# Patient Record
Sex: FEMALE | Race: WHITE | NOT HISPANIC OR LATINO | URBAN - METROPOLITAN AREA
[De-identification: names, ages, dates, MRNs, and addresses within clinical notes are randomized per-mention and may not be internally consistent; named-entity substitution may affect disease eponyms.]

---

## 2017-10-21 ENCOUNTER — OUTPATIENT (OUTPATIENT)
Dept: OUTPATIENT SERVICES | Facility: HOSPITAL | Age: 57
LOS: 1 days | Discharge: HOME | End: 2017-10-21

## 2017-10-21 DIAGNOSIS — Z12.31 ENCOUNTER FOR SCREENING MAMMOGRAM FOR MALIGNANT NEOPLASM OF BREAST: ICD-10-CM

## 2018-03-01 PROBLEM — Z00.00 ENCOUNTER FOR PREVENTIVE HEALTH EXAMINATION: Status: ACTIVE | Noted: 2018-03-01

## 2018-03-05 ENCOUNTER — APPOINTMENT (OUTPATIENT)
Dept: OBGYN | Facility: CLINIC | Age: 58
End: 2018-03-05
Payer: COMMERCIAL

## 2018-03-05 PROCEDURE — 99396 PREV VISIT EST AGE 40-64: CPT

## 2018-05-17 ENCOUNTER — APPOINTMENT (OUTPATIENT)
Dept: OBGYN | Facility: CLINIC | Age: 58
End: 2018-05-17
Payer: COMMERCIAL

## 2018-05-17 PROCEDURE — 99214 OFFICE O/P EST MOD 30 MIN: CPT

## 2018-05-29 ENCOUNTER — OUTPATIENT (OUTPATIENT)
Dept: OUTPATIENT SERVICES | Facility: HOSPITAL | Age: 58
LOS: 1 days | Discharge: HOME | End: 2018-05-29

## 2018-05-29 DIAGNOSIS — R92.8 OTHER ABNORMAL AND INCONCLUSIVE FINDINGS ON DIAGNOSTIC IMAGING OF BREAST: ICD-10-CM

## 2018-10-30 ENCOUNTER — OUTPATIENT (OUTPATIENT)
Dept: OUTPATIENT SERVICES | Facility: HOSPITAL | Age: 58
LOS: 1 days | Discharge: HOME | End: 2018-10-30

## 2018-10-30 DIAGNOSIS — Z12.31 ENCOUNTER FOR SCREENING MAMMOGRAM FOR MALIGNANT NEOPLASM OF BREAST: ICD-10-CM

## 2018-11-06 ENCOUNTER — OUTPATIENT (OUTPATIENT)
Dept: OUTPATIENT SERVICES | Facility: HOSPITAL | Age: 58
LOS: 1 days | Discharge: HOME | End: 2018-11-06

## 2018-11-06 DIAGNOSIS — R92.8 OTHER ABNORMAL AND INCONCLUSIVE FINDINGS ON DIAGNOSTIC IMAGING OF BREAST: ICD-10-CM

## 2018-11-14 ENCOUNTER — OUTPATIENT (OUTPATIENT)
Dept: OUTPATIENT SERVICES | Facility: HOSPITAL | Age: 58
LOS: 1 days | Discharge: HOME | End: 2018-11-14

## 2018-11-14 ENCOUNTER — RESULT REVIEW (OUTPATIENT)
Age: 58
End: 2018-11-14

## 2018-11-14 VITALS — HEART RATE: 72 BPM | RESPIRATION RATE: 15 BRPM | DIASTOLIC BLOOD PRESSURE: 53 MMHG | SYSTOLIC BLOOD PRESSURE: 105 MMHG

## 2018-11-14 VITALS
DIASTOLIC BLOOD PRESSURE: 70 MMHG | OXYGEN SATURATION: 100 % | HEIGHT: 64 IN | TEMPERATURE: 98 F | SYSTOLIC BLOOD PRESSURE: 119 MMHG | RESPIRATION RATE: 18 BRPM | WEIGHT: 154.98 LBS | HEART RATE: 74 BPM

## 2018-11-14 DIAGNOSIS — Z98.890 OTHER SPECIFIED POSTPROCEDURAL STATES: Chronic | ICD-10-CM

## 2018-11-15 LAB
SURGICAL PATHOLOGY STUDY: SIGNIFICANT CHANGE UP
SURGICAL PATHOLOGY STUDY: SIGNIFICANT CHANGE UP

## 2018-11-16 DIAGNOSIS — D12.5 BENIGN NEOPLASM OF SIGMOID COLON: ICD-10-CM

## 2018-11-16 DIAGNOSIS — Z12.11 ENCOUNTER FOR SCREENING FOR MALIGNANT NEOPLASM OF COLON: ICD-10-CM

## 2018-11-16 DIAGNOSIS — K64.4 RESIDUAL HEMORRHOIDAL SKIN TAGS: ICD-10-CM

## 2018-11-16 DIAGNOSIS — D12.3 BENIGN NEOPLASM OF TRANSVERSE COLON: ICD-10-CM

## 2018-11-16 DIAGNOSIS — D12.8 BENIGN NEOPLASM OF RECTUM: ICD-10-CM

## 2018-11-16 DIAGNOSIS — K29.50 UNSPECIFIED CHRONIC GASTRITIS WITHOUT BLEEDING: ICD-10-CM

## 2018-11-16 DIAGNOSIS — K63.89 OTHER SPECIFIED DISEASES OF INTESTINE: ICD-10-CM

## 2018-11-16 DIAGNOSIS — K57.30 DIVERTICULOSIS OF LARGE INTESTINE WITHOUT PERFORATION OR ABSCESS WITHOUT BLEEDING: ICD-10-CM

## 2019-01-24 NOTE — ASU PATIENT PROFILE, ADULT - PATIENT'S HEIGHT AND WEIGHT RECORDED IN THE VITAL SIGNS FLOWSHEET
The types of intraocular lenses were reviewed with the patient along with a discussion of their various strengths and weaknesses. yes

## 2019-02-15 PROBLEM — Z87.39 PERSONAL HISTORY OF OTHER DISEASES OF THE MUSCULOSKELETAL SYSTEM AND CONNECTIVE TISSUE: Chronic | Status: ACTIVE | Noted: 2018-11-14

## 2019-03-07 ENCOUNTER — APPOINTMENT (OUTPATIENT)
Dept: OBGYN | Facility: CLINIC | Age: 59
End: 2019-03-07
Payer: COMMERCIAL

## 2019-03-07 PROCEDURE — 99396 PREV VISIT EST AGE 40-64: CPT

## 2019-11-02 ENCOUNTER — OUTPATIENT (OUTPATIENT)
Dept: OUTPATIENT SERVICES | Facility: HOSPITAL | Age: 59
LOS: 1 days | Discharge: HOME | End: 2019-11-02
Payer: COMMERCIAL

## 2019-11-02 DIAGNOSIS — Z12.31 ENCOUNTER FOR SCREENING MAMMOGRAM FOR MALIGNANT NEOPLASM OF BREAST: ICD-10-CM

## 2019-11-02 DIAGNOSIS — Z98.890 OTHER SPECIFIED POSTPROCEDURAL STATES: Chronic | ICD-10-CM

## 2019-11-02 PROCEDURE — 77067 SCR MAMMO BI INCL CAD: CPT | Mod: 26

## 2019-11-02 PROCEDURE — 77063 BREAST TOMOSYNTHESIS BI: CPT | Mod: 26

## 2019-11-05 DIAGNOSIS — Z78.0 ASYMPTOMATIC MENOPAUSAL STATE: ICD-10-CM

## 2019-11-05 DIAGNOSIS — M81.0 AGE-RELATED OSTEOPOROSIS WITHOUT CURRENT PATHOLOGICAL FRACTURE: ICD-10-CM

## 2019-11-05 DIAGNOSIS — Z09 ENCOUNTER FOR FOLLOW-UP EXAMINATION AFTER COMPLETED TREATMENT FOR CONDITIONS OTHER THAN MALIGNANT NEOPLASM: ICD-10-CM

## 2020-03-12 ENCOUNTER — APPOINTMENT (OUTPATIENT)
Dept: OBGYN | Facility: CLINIC | Age: 60
End: 2020-03-12
Payer: COMMERCIAL

## 2020-03-12 PROCEDURE — 99396 PREV VISIT EST AGE 40-64: CPT

## 2020-11-07 ENCOUNTER — RESULT REVIEW (OUTPATIENT)
Age: 60
End: 2020-11-07

## 2020-11-07 ENCOUNTER — OUTPATIENT (OUTPATIENT)
Dept: OUTPATIENT SERVICES | Facility: HOSPITAL | Age: 60
LOS: 1 days | Discharge: HOME | End: 2020-11-07
Payer: COMMERCIAL

## 2020-11-07 DIAGNOSIS — Z98.890 OTHER SPECIFIED POSTPROCEDURAL STATES: Chronic | ICD-10-CM

## 2020-11-07 DIAGNOSIS — R92.8 OTHER ABNORMAL AND INCONCLUSIVE FINDINGS ON DIAGNOSTIC IMAGING OF BREAST: ICD-10-CM

## 2020-11-07 PROCEDURE — G0279: CPT | Mod: 26

## 2020-11-07 PROCEDURE — 76642 ULTRASOUND BREAST LIMITED: CPT | Mod: 26,LT

## 2020-11-07 PROCEDURE — 77066 DX MAMMO INCL CAD BI: CPT | Mod: 26

## 2021-03-25 ENCOUNTER — FORM ENCOUNTER (OUTPATIENT)
Age: 61
End: 2021-03-25

## 2021-03-26 ENCOUNTER — TRANSCRIPTION ENCOUNTER (OUTPATIENT)
Age: 61
End: 2021-03-26

## 2021-03-27 ENCOUNTER — OUTPATIENT (OUTPATIENT)
Dept: INPATIENT UNIT | Facility: HOSPITAL | Age: 61
LOS: 1 days | Discharge: HOME | End: 2021-03-27

## 2021-03-27 ENCOUNTER — APPOINTMENT (OUTPATIENT)
Dept: DISASTER EMERGENCY | Facility: CLINIC | Age: 61
End: 2021-03-27

## 2021-03-27 VITALS
TEMPERATURE: 98 F | HEART RATE: 115 BPM | SYSTOLIC BLOOD PRESSURE: 128 MMHG | DIASTOLIC BLOOD PRESSURE: 72 MMHG | RESPIRATION RATE: 18 BRPM | OXYGEN SATURATION: 97 %

## 2021-03-27 VITALS
SYSTOLIC BLOOD PRESSURE: 103 MMHG | HEART RATE: 98 BPM | TEMPERATURE: 98 F | RESPIRATION RATE: 18 BRPM | OXYGEN SATURATION: 95 % | DIASTOLIC BLOOD PRESSURE: 62 MMHG

## 2021-03-27 DIAGNOSIS — Z98.890 OTHER SPECIFIED POSTPROCEDURAL STATES: Chronic | ICD-10-CM

## 2021-03-27 RX ORDER — SODIUM CHLORIDE 9 MG/ML
250 INJECTION INTRAMUSCULAR; INTRAVENOUS; SUBCUTANEOUS
Refills: 0 | Status: DISCONTINUED | OUTPATIENT
Start: 2021-03-27 | End: 2021-03-27

## 2021-03-27 RX ADMIN — SODIUM CHLORIDE 25 MILLILITER(S): 9 INJECTION INTRAMUSCULAR; INTRAVENOUS; SUBCUTANEOUS at 11:10

## 2021-03-27 NOTE — CHART NOTE - NSCHARTNOTEFT_GEN_A_CORE
Medicine Progress Note    Patient is a 60y old  Female who presents with a chief complaint of cough, fever, No GI symptoms.    SUBJECTIVE / OVERNIGHT EVENTS:    ADDITIONAL REVIEW OF SYSTEMS:    MEDICATIONS  (STANDING): Actonel for bone Health.   sodium chloride 0.9%. 250 milliLiter(s) (25 mL/Hr) IV Continuous <Continuous>    MEDICATIONS  (PRN):          PHYSICAL EXAM:  Vital Signs Last 24 Hrs  T(C): 36.8 (27 Mar 2021 11:45), Max: 36.8 (27 Mar 2021 11:45)  T(F): 98.2 (27 Mar 2021 11:45), Max: 98.2 (27 Mar 2021 11:45)  HR: 100 (27 Mar 2021 11:45) (95 - 115)  BP: 118/74 (27 Mar 2021 11:45) (110/63 - 128/72)  BP(mean): --  RR: 18 (27 Mar 2021 11:45) (18 - 18)  SpO2: 95% (27 Mar 2021 11:45) (95% - 97%)  CONSTITUTIONAL: NAD, well-developed, well-groomed  ENMT: Moist oral mucosa, no pharyngeal injection or exudates; normal dentition  RESPIRATORY: Normal respiratory effort; lungs are clear to auscultation bilaterally  CARDIOVASCULAR: Regular rate and rhythm, normal S1 and S2, no murmur/rub/gallop; No lower extremity edema; Peripheral pulses are 2+ bilaterally  ABDOMEN: Nontender to palpation, normoactive bowel sounds, no rebound/guarding; No hepatosplenomegaly  PSYCH: A+O to person, place, and time; affect appropriate  NEUROLOGY: CN 2-12 are intact and symmetric; no gross sensory deficits   SKIN: No rashes; no palpable lesions    Assessment :  This is 60 years old female Covid + from 3/21/21, referred by Dr Romero. Patient presents to the Infusion [facility] for Monoclonal Infusion ( casirivimab / Imdevimab).  Plan:  Infusion Procedure explained to the patient.  Consent for Monoclonal antibody treatment obtained/ all questions answered.   Risk & benefit discussed.  Infuse Casirivimab 1200 mg Iv / Imdevimab 12 mg Iv ) over one hour.  Observe Patient for one hour post infusion.  Patient tolerated treatment well.  patient was discharged in stable condition.  Patient was instructed to maintain Social distance , Hand hygiene quarantine.  patient instructed to  reported to ER if she experience CP , SOB, Difficulty breathing , Fever > 100.4 or Pulse Ox < 93%.    Patient received Incentive Spirometer and instruction to utilize it at home.                            RADIOLOGY & ADDITIONAL TESTS:  Imaging from Last 24 Hours:    Electrocardiogram/QTc Interval:    COORDINATION OF CARE:  Care Discussed with Consultants/Other Providers:

## 2021-03-28 ENCOUNTER — TRANSCRIPTION ENCOUNTER (OUTPATIENT)
Age: 61
End: 2021-03-28

## 2021-03-28 DIAGNOSIS — U07.1 COVID-19: ICD-10-CM

## 2021-03-30 ENCOUNTER — TRANSCRIPTION ENCOUNTER (OUTPATIENT)
Age: 61
End: 2021-03-30

## 2021-04-22 ENCOUNTER — APPOINTMENT (OUTPATIENT)
Dept: OBGYN | Facility: CLINIC | Age: 61
End: 2021-04-22

## 2021-12-07 ENCOUNTER — APPOINTMENT (OUTPATIENT)
Dept: OBGYN | Facility: CLINIC | Age: 61
End: 2021-12-07

## 2022-02-13 NOTE — ASU PREOP CHECKLIST - PATIENT PROBLEMS/NEEDS
- currently on insulin drip, wean as tolerated   - Endocrine following   - steroid taper per protocol. Patient expressed no known problems or needs

## 2022-02-28 ENCOUNTER — APPOINTMENT (OUTPATIENT)
Dept: OBGYN | Facility: CLINIC | Age: 62
End: 2022-02-28
Payer: COMMERCIAL

## 2022-02-28 PROCEDURE — 99396 PREV VISIT EST AGE 40-64: CPT

## 2022-03-07 ENCOUNTER — APPOINTMENT (OUTPATIENT)
Dept: OBGYN | Facility: CLINIC | Age: 62
End: 2022-03-07
Payer: COMMERCIAL

## 2022-03-07 PROCEDURE — 76830 TRANSVAGINAL US NON-OB: CPT

## 2022-05-25 ENCOUNTER — OUTPATIENT (OUTPATIENT)
Dept: OUTPATIENT SERVICES | Facility: HOSPITAL | Age: 62
LOS: 1 days | Discharge: HOME | End: 2022-05-25
Payer: COMMERCIAL

## 2022-05-25 ENCOUNTER — RESULT REVIEW (OUTPATIENT)
Age: 62
End: 2022-05-25

## 2022-05-25 DIAGNOSIS — Z12.31 ENCOUNTER FOR SCREENING MAMMOGRAM FOR MALIGNANT NEOPLASM OF BREAST: ICD-10-CM

## 2022-05-25 DIAGNOSIS — Z98.890 OTHER SPECIFIED POSTPROCEDURAL STATES: Chronic | ICD-10-CM

## 2022-05-25 PROCEDURE — 77067 SCR MAMMO BI INCL CAD: CPT | Mod: 26

## 2022-05-25 PROCEDURE — 77063 BREAST TOMOSYNTHESIS BI: CPT | Mod: 26

## 2022-05-26 DIAGNOSIS — Z78.0 ASYMPTOMATIC MENOPAUSAL STATE: ICD-10-CM

## 2022-05-26 DIAGNOSIS — Z09 ENCOUNTER FOR FOLLOW-UP EXAMINATION AFTER COMPLETED TREATMENT FOR CONDITIONS OTHER THAN MALIGNANT NEOPLASM: ICD-10-CM

## 2022-05-26 DIAGNOSIS — M81.0 AGE-RELATED OSTEOPOROSIS WITHOUT CURRENT PATHOLOGICAL FRACTURE: ICD-10-CM

## 2023-03-16 ENCOUNTER — APPOINTMENT (OUTPATIENT)
Dept: OBGYN | Facility: CLINIC | Age: 63
End: 2023-03-16
Payer: COMMERCIAL

## 2023-03-16 ENCOUNTER — NON-APPOINTMENT (OUTPATIENT)
Age: 63
End: 2023-03-16

## 2023-03-16 VITALS
HEIGHT: 64 IN | BODY MASS INDEX: 23.9 KG/M2 | SYSTOLIC BLOOD PRESSURE: 120 MMHG | WEIGHT: 140 LBS | DIASTOLIC BLOOD PRESSURE: 80 MMHG

## 2023-03-16 DIAGNOSIS — Z01.419 ENCOUNTER FOR GYNECOLOGICAL EXAMINATION (GENERAL) (ROUTINE) W/OUT ABNORMAL FINDINGS: ICD-10-CM

## 2023-03-16 PROCEDURE — 99396 PREV VISIT EST AGE 40-64: CPT

## 2023-03-16 NOTE — HISTORY OF PRESENT ILLNESS
[FreeTextEntry1] : ---63 Y/O  HERE FOR CHECK UP.\par PMHX;/ OSTEOPOROSIS ON ACTONEL MONTHLY  COLON CANCER\par PSHX;/PATIAL COLECTOMY AND CHEMO\par SOCIAL;-ETOH STOPPED CIGG\par STD;     /         DISCUSSED CONDOMS\par FAMILY HX OF BREAST CANCER;\par REVIEW OF SYMPTOMS DONE\par ALLERGIES;  Patient has answered NKDA\par Medication reconciliation was completed by reviewing, with the patient's\par involvement, the patient's current outpatient medications and those \par ordered for the patient today. \par \par PE;BREASTS;-MASSES DC NODES SBE\par ABD SOFT NT ND\par NL GENIT \par VAGINA -DC \par CX -CMT \par UTERUS TOP NL SIZE NT\par ADNEXA NT -MASSES\par \par A;P;CHECK UP\par -PAP\par -OLAYINKA \par -F-U PRN.

## 2023-06-28 NOTE — ASU PATIENT PROFILE, ADULT - HARM RISK FACTORS
Sox10 - Negative Histology Text: SOX10 staining demonstrates a normal density and pattern of melanocytes along the dermal-epidermal junction. The surgical margins are negative for tumor cells. yes

## 2023-06-30 ENCOUNTER — INPATIENT (INPATIENT)
Facility: HOSPITAL | Age: 63
LOS: 0 days | Discharge: HOME | DRG: 101 | End: 2023-07-01
Attending: HOSPITALIST | Admitting: HOSPITALIST
Payer: COMMERCIAL

## 2023-06-30 VITALS
RESPIRATION RATE: 20 BRPM | SYSTOLIC BLOOD PRESSURE: 163 MMHG | TEMPERATURE: 98 F | DIASTOLIC BLOOD PRESSURE: 88 MMHG | HEART RATE: 92 BPM | OXYGEN SATURATION: 100 %

## 2023-06-30 VITALS — SYSTOLIC BLOOD PRESSURE: 121 MMHG | DIASTOLIC BLOOD PRESSURE: 65 MMHG

## 2023-06-30 DIAGNOSIS — Z98.890 OTHER SPECIFIED POSTPROCEDURAL STATES: Chronic | ICD-10-CM

## 2023-06-30 DIAGNOSIS — R56.9 UNSPECIFIED CONVULSIONS: ICD-10-CM

## 2023-06-30 LAB
ALBUMIN SERPL ELPH-MCNC: 4.2 G/DL — SIGNIFICANT CHANGE UP (ref 3.5–5.2)
ALP SERPL-CCNC: 140 U/L — HIGH (ref 30–115)
ALT FLD-CCNC: 43 U/L — HIGH (ref 0–41)
AMMONIA BLD-MCNC: 17 UMOL/L — SIGNIFICANT CHANGE UP (ref 11–55)
ANION GAP SERPL CALC-SCNC: 22 MMOL/L — HIGH (ref 7–14)
APTT BLD: 26.6 SEC — LOW (ref 27–39.2)
AST SERPL-CCNC: 62 U/L — HIGH (ref 0–41)
BASOPHILS # BLD AUTO: 0.04 K/UL — SIGNIFICANT CHANGE UP (ref 0–0.2)
BASOPHILS NFR BLD AUTO: 0.3 % — SIGNIFICANT CHANGE UP (ref 0–1)
BILIRUB DIRECT SERPL-MCNC: 0.4 MG/DL — HIGH (ref 0–0.3)
BILIRUB INDIRECT FLD-MCNC: 1.1 MG/DL — SIGNIFICANT CHANGE UP (ref 0.2–1.2)
BILIRUB SERPL-MCNC: 1.5 MG/DL — HIGH (ref 0.2–1.2)
BUN SERPL-MCNC: 11 MG/DL — SIGNIFICANT CHANGE UP (ref 10–20)
CALCIUM SERPL-MCNC: 9.2 MG/DL — SIGNIFICANT CHANGE UP (ref 8.4–10.5)
CHLORIDE SERPL-SCNC: 94 MMOL/L — LOW (ref 98–110)
CO2 SERPL-SCNC: 15 MMOL/L — LOW (ref 17–32)
CREAT SERPL-MCNC: 0.7 MG/DL — SIGNIFICANT CHANGE UP (ref 0.7–1.5)
EGFR: 97 ML/MIN/1.73M2 — SIGNIFICANT CHANGE UP
EOSINOPHIL # BLD AUTO: 0.02 K/UL — SIGNIFICANT CHANGE UP (ref 0–0.7)
EOSINOPHIL NFR BLD AUTO: 0.1 % — SIGNIFICANT CHANGE UP (ref 0–8)
GLUCOSE SERPL-MCNC: 150 MG/DL — HIGH (ref 70–99)
HCT VFR BLD CALC: 35.9 % — LOW (ref 37–47)
HGB BLD-MCNC: 12.3 G/DL — SIGNIFICANT CHANGE UP (ref 12–16)
IMM GRANULOCYTES NFR BLD AUTO: 1.1 % — HIGH (ref 0.1–0.3)
INR BLD: 1.36 RATIO — HIGH (ref 0.65–1.3)
LACTATE SERPL-SCNC: 2.8 MMOL/L — HIGH (ref 0.7–2)
LACTATE SERPL-SCNC: 7.5 MMOL/L — CRITICAL HIGH (ref 0.7–2)
LIDOCAIN IGE QN: 47 U/L — SIGNIFICANT CHANGE UP (ref 7–60)
LYMPHOCYTES # BLD AUTO: 0.89 K/UL — LOW (ref 1.2–3.4)
LYMPHOCYTES # BLD AUTO: 6 % — LOW (ref 20.5–51.1)
MAGNESIUM SERPL-MCNC: 1.8 MG/DL — SIGNIFICANT CHANGE UP (ref 1.8–2.4)
MCHC RBC-ENTMCNC: 29.4 PG — SIGNIFICANT CHANGE UP (ref 27–31)
MCHC RBC-ENTMCNC: 34.3 G/DL — SIGNIFICANT CHANGE UP (ref 32–37)
MCV RBC AUTO: 85.7 FL — SIGNIFICANT CHANGE UP (ref 81–99)
MONOCYTES # BLD AUTO: 1.41 K/UL — HIGH (ref 0.1–0.6)
MONOCYTES NFR BLD AUTO: 9.5 % — HIGH (ref 1.7–9.3)
NEUTROPHILS # BLD AUTO: 12.38 K/UL — HIGH (ref 1.4–6.5)
NEUTROPHILS NFR BLD AUTO: 83 % — HIGH (ref 42.2–75.2)
NRBC # BLD: 0 /100 WBCS — SIGNIFICANT CHANGE UP (ref 0–0)
PHOSPHATE SERPL-MCNC: 2.2 MG/DL — SIGNIFICANT CHANGE UP (ref 2.1–4.9)
PLATELET # BLD AUTO: 137 K/UL — SIGNIFICANT CHANGE UP (ref 130–400)
PMV BLD: 9.1 FL — SIGNIFICANT CHANGE UP (ref 7.4–10.4)
POTASSIUM SERPL-MCNC: 3.7 MMOL/L — SIGNIFICANT CHANGE UP (ref 3.5–5)
POTASSIUM SERPL-SCNC: 3.7 MMOL/L — SIGNIFICANT CHANGE UP (ref 3.5–5)
PROT SERPL-MCNC: 7.3 G/DL — SIGNIFICANT CHANGE UP (ref 6–8)
PROTHROM AB SERPL-ACNC: 15.7 SEC — HIGH (ref 9.95–12.87)
RBC # BLD: 4.19 M/UL — LOW (ref 4.2–5.4)
RBC # FLD: 18.6 % — HIGH (ref 11.5–14.5)
SODIUM SERPL-SCNC: 131 MMOL/L — LOW (ref 135–146)
TROPONIN T SERPL-MCNC: <0.01 NG/ML — SIGNIFICANT CHANGE UP
URATE SERPL-MCNC: 7.5 MG/DL — HIGH (ref 2.5–7)
WBC # BLD: 14.91 K/UL — HIGH (ref 4.8–10.8)
WBC # FLD AUTO: 14.91 K/UL — HIGH (ref 4.8–10.8)

## 2023-06-30 PROCEDURE — 99222 1ST HOSP IP/OBS MODERATE 55: CPT

## 2023-06-30 PROCEDURE — 74177 CT ABD & PELVIS W/CONTRAST: CPT | Mod: 26,MA

## 2023-06-30 PROCEDURE — 70450 CT HEAD/BRAIN W/O DYE: CPT | Mod: 26,MA

## 2023-06-30 PROCEDURE — 95819 EEG AWAKE AND ASLEEP: CPT

## 2023-06-30 PROCEDURE — 83605 ASSAY OF LACTIC ACID: CPT

## 2023-06-30 PROCEDURE — 36415 COLL VENOUS BLD VENIPUNCTURE: CPT

## 2023-06-30 PROCEDURE — 95816 EEG AWAKE AND DROWSY: CPT | Mod: 26

## 2023-06-30 PROCEDURE — 99285 EMERGENCY DEPT VISIT HI MDM: CPT

## 2023-06-30 RX ORDER — ONDANSETRON 8 MG/1
4 TABLET, FILM COATED ORAL EVERY 8 HOURS
Refills: 0 | Status: DISCONTINUED | OUTPATIENT
Start: 2023-06-30 | End: 2023-07-01

## 2023-06-30 RX ORDER — HYDRALAZINE HCL 50 MG
10 TABLET ORAL ONCE
Refills: 0 | Status: COMPLETED | OUTPATIENT
Start: 2023-06-30 | End: 2023-06-30

## 2023-06-30 RX ORDER — KETOROLAC TROMETHAMINE 30 MG/ML
30 SYRINGE (ML) INJECTION ONCE
Refills: 0 | Status: DISCONTINUED | OUTPATIENT
Start: 2023-06-30 | End: 2023-06-30

## 2023-06-30 RX ORDER — ONDANSETRON 8 MG/1
4 TABLET, FILM COATED ORAL ONCE
Refills: 0 | Status: COMPLETED | OUTPATIENT
Start: 2023-06-30 | End: 2023-06-30

## 2023-06-30 RX ORDER — MORPHINE SULFATE 50 MG/1
2 CAPSULE, EXTENDED RELEASE ORAL EVERY 6 HOURS
Refills: 0 | Status: DISCONTINUED | OUTPATIENT
Start: 2023-06-30 | End: 2023-07-01

## 2023-06-30 RX ORDER — SODIUM CHLORIDE 9 MG/ML
1000 INJECTION INTRAMUSCULAR; INTRAVENOUS; SUBCUTANEOUS ONCE
Refills: 0 | Status: COMPLETED | OUTPATIENT
Start: 2023-06-30 | End: 2023-06-30

## 2023-06-30 RX ORDER — RISEDRONATE SODIUM 25.8; 4.2 MG/1; MG/1
1 TABLET, FILM COATED ORAL
Qty: 0 | Refills: 0 | DISCHARGE

## 2023-06-30 RX ORDER — MAGNESIUM SULFATE 500 MG/ML
2 VIAL (ML) INJECTION ONCE
Refills: 0 | Status: COMPLETED | OUTPATIENT
Start: 2023-06-30 | End: 2023-06-30

## 2023-06-30 RX ADMIN — MORPHINE SULFATE 2 MILLIGRAM(S): 50 CAPSULE, EXTENDED RELEASE ORAL at 15:46

## 2023-06-30 RX ADMIN — ONDANSETRON 4 MILLIGRAM(S): 8 TABLET, FILM COATED ORAL at 21:58

## 2023-06-30 RX ADMIN — ONDANSETRON 4 MILLIGRAM(S): 8 TABLET, FILM COATED ORAL at 16:44

## 2023-06-30 RX ADMIN — Medication 30 MILLIGRAM(S): at 22:30

## 2023-06-30 RX ADMIN — Medication 10 MILLIGRAM(S): at 21:58

## 2023-06-30 RX ADMIN — SODIUM CHLORIDE 1000 MILLILITER(S): 9 INJECTION INTRAMUSCULAR; INTRAVENOUS; SUBCUTANEOUS at 09:36

## 2023-06-30 RX ADMIN — Medication 150 GRAM(S): at 09:36

## 2023-06-30 RX ADMIN — ONDANSETRON 4 MILLIGRAM(S): 8 TABLET, FILM COATED ORAL at 10:33

## 2023-06-30 RX ADMIN — MORPHINE SULFATE 2 MILLIGRAM(S): 50 CAPSULE, EXTENDED RELEASE ORAL at 16:06

## 2023-06-30 RX ADMIN — Medication 30 MILLIGRAM(S): at 21:58

## 2023-06-30 NOTE — ED PROVIDER NOTE - CLINICAL SUMMARY MEDICAL DECISION MAKING FREE TEXT BOX
pt with suspected seizure today - opioid naive and used last night after Y90 procedure - returned to baseline - neg ct head - admit for monitoring and further workup given neoplastic history

## 2023-06-30 NOTE — H&P ADULT - ASSESSMENT
Patient is a 62 year old female with hx of colon ca with liver mets s/p colon resection, chemo, Y90 brachytherapy for liver yesterday, presenting to ED after possible seizure at home witnessed by . Last night patient took 4 tablets of percocet in span of 2 hrs for abdominal pain from Y90. Patient woke up this morning.  noted sudden episode of rigidity, not following commands, foaming at mouth, incontinence. Episode lasted approx 3 min. Brought to ED by EMS. Patient post ictal in ED, now at baseline, does not remember event. No hx of seizure. No hx of opiate use prior to yesterday. Did not eat for 2 days. Per patient's interventional radiologist Dr. Ramesh Durán [facility], abdominal pain is expected to last approximately one week.    In ED: Na 130, CTH neg, CT a/p with liver mets, GB sludge. No murphys on exam. WBC 15, lactate 7.5.    # possible seizure  - admit to medicine  - seizure precautions  - routine EEG  - no AED  - ativan prn for seizure  - add on Mg level, keep > 2.0  - qtc was prolonged on ekg, was given mg in ED    # colon ca with liver mets  - s/p y90 procedure yesterday  - outpt f/u    # GB sludge  - possible hida scan  - no murphys    # osteoporosis  - cont home med    Diet: reg  Activity: OOB  DVT PPX: Lovenox  GI PPX: PPI  Code status: full code  Dispo: acute from home   Patient is a 62 year old female with hx of colon ca with liver mets s/p colon resection, chemo, Y90 brachytherapy for liver yesterday, presenting to ED after possible seizure at home witnessed by . Last night patient took 4 tablets of percocet in span of 2 hrs for abdominal pain from Y90. Patient woke up this morning.  noted sudden episode of rigidity, not following commands, foaming at mouth, incontinence. Episode lasted approx 3 min. Brought to ED by EMS. Patient post ictal in ED, now at baseline, does not remember event. No hx of seizure. No hx of opiate use prior to yesterday. Did not eat for 2 days. Per patient's interventional radiologist Dr. Ramesh Durán [facility], abdominal pain is expected to last approximately one week.    In ED: Na 130, CTH neg, CT a/p with liver mets, GB sludge. No murphys on exam. WBC 15, lactate 7.5.    # possible seizure  - admit to medicine  - seizure precautions  - routine EEG  - no AED  - ativan prn for seizure  - add on Mg level, keep > 2.0  - qtc was prolonged on ekg, was given mg in ED    # colon ca with liver mets  - s/p y90 procedure yesterday  - morphine prn for pain  - outpt f/u    # GB sludge  - possible hida scan  - no murphys    # osteoporosis  - cont home med    Diet: reg  Activity: OOB  DVT PPX: Lovenox  GI PPX: PPI  Code status: full code  Dispo: acute from home   Patient is a 62 year old female with hx of colon ca with liver mets s/p colon resection, chemo, Y90 brachytherapy for liver yesterday, presenting to ED after possible seizure at home witnessed by . Last night patient took 4 tablets of percocet in span of 2 hrs for abdominal pain from Y90. Patient woke up this morning.  noted sudden episode of rigidity, not following commands, foaming at mouth, incontinence. Episode lasted approx 3 min. Brought to ED by EMS. Patient post ictal in ED, now at baseline, does not remember event. No hx of seizure. No hx of opiate use prior to yesterday. Did not eat for 2 days. Per patient's interventional radiologist Dr. Ramesh Durán [facility], abdominal pain is expected to last approximately one week.    In ED: Na 130, CTH neg, CT a/p with liver mets, GB sludge. No murphys on exam. WBC 15, lactate 7.5.    # possible seizure  - admit to medicine  - seizure precautions  - routine EEG  - no AED  - ativan prn for seizure  - add on Mg level, keep > 2.0  - qtc was prolonged on ekg, was given mg in ED  - neuro consult    # colon ca with liver mets  - s/p y90 procedure yesterday  - morphine prn for pain  - outpt f/u    # GB sludge  - possible hida scan  - no murphys    # osteoporosis  - cont home med    Diet: reg  Activity: OOB  DVT PPX: Lovenox  GI PPX: PPI  Code status: full code  Dispo: acute from home

## 2023-06-30 NOTE — ED ADULT TRIAGE NOTE - CHIEF COMPLAINT QUOTE
Pt arrives by ambulance from home. As per EMT "She appeared to have a seizure this morning. She seemed post-ictal when we arrived. She was incontinent of urine. " Pt has history of colon cancer and now a tumor on the liver. Pt had "Liver Yttrium-90 Radioembolization (Y90) yesterday at [place]."

## 2023-06-30 NOTE — PATIENT PROFILE ADULT - FALL HARM RISK - HARM RISK INTERVENTIONS

## 2023-06-30 NOTE — H&P ADULT - NSHPPHYSICALEXAM_GEN_ALL_CORE
Vital Signs Last 24 Hrs  T(C): 36.9 (30 Jun 2023 09:07), Max: 36.9 (30 Jun 2023 09:07)  T(F): 98.4 (30 Jun 2023 09:07), Max: 98.4 (30 Jun 2023 09:07)  HR: 92 (30 Jun 2023 09:07) (92 - 92)  BP: 163/88 (30 Jun 2023 09:07) (163/88 - 163/88)  RR: 20 (30 Jun 2023 09:07) (20 - 20)  SpO2: 100% (30 Jun 2023 09:07) (100% - 100%)    Parameters below as of 30 Jun 2023 09:07  Patient On (Oxygen Delivery Method): room air    PHYSICAL EXAM:  GENERAL: ill appearing  SKIN: No rashes or lesions  HEAD:  Atraumatic, Normocephalic  EYES: EOMI, PERRLA, conjunctiva and sclera clear  NECK: Supple, No JVD  CHEST/LUNG: Clear to auscultation bilaterally; No wheeze  HEART: Regular rate and rhythm; No murmurs, rubs, or gallops. distal pulses 2+ and equal bilateral  ABDOMEN: Soft, Nontender, Nondistended; Bowel sounds present, reducible umbilical hernia  EXTREMITIES:  No clubbing, cyanosis, or edema  CNS: AAOx3

## 2023-06-30 NOTE — ED PROVIDER NOTE - PHYSICAL EXAMINATION
VITAL SIGNS: I have reviewed nursing notes and confirm.  CONSTITUTIONAL: ill-appearing, non-toxic, in NAD  SKIN: Warm dry, normal skin turgor  HEAD: NCAT  EYES: No conjunctival injection, scleral anicteric  ENT: Moist mucous membranes, normal pharynx with no erythema or exudates  NECK: Supple; full ROM. Nontender. No cervical LAD  CARD: RRR, no murmurs, rubs or gallops  RESP: Clear to ausculation bilaterally.  No rales, rhonchi, or wheezing.  ABD: Soft, non-distended, diffusely tender, no rebound or guarding. No CVA tenderness  EXT: Full ROM, no bony tenderness, no pedal edema, no calf tenderness  NEURO: Normal motor, normal sensory. AOx2; GCS - 14 (confused)  PSYCH: Cooperative, appropriate.

## 2023-06-30 NOTE — H&P ADULT - HISTORY OF PRESENT ILLNESS
Patient is a 62 year old female with hx of colon ca with liver mets s/p colon resection, chemo, Y90 brachytherapy for liver yesterday, presenting to ED after possible seizure at home witnessed by . Last night patient took 4 tablets of percocet in span of 2 hrs for abdominal pain from Y90. Patient woke up this morning.  noted sudden episode of rigidity, not following commands, foaming at mouth, incontinence. Episode lasted approx 3 min. Brought to ED by EMS. Patient post ictal in ED, now at baseline, does not remember event. No hx of seizure. No hx of opiate use prior to yesterday. Did not eat for 2 days. Per patient's interventional radiologist Dr. Ramesh Durán [facility], abdominal pain is expected to last approximately one week.    In ED: Na 130, CTH neg, CT a/p with liver mets, GB sludge. No murphys on exam. WBC 15, lactate 7.5.

## 2023-06-30 NOTE — CHART NOTE - NSCHARTNOTEFT_GEN_A_CORE
Pt will be transferred to [facility] and transfer will be accepted there by Dr Ramesh Durán. Transfer order is in. Dr Kenney aware.

## 2023-06-30 NOTE — ED ADULT NURSE NOTE - CHIEF COMPLAINT QUOTE
Pt arrives by ambulance from home. As per EMT "She appeared to have a seizure this morning. She seemed post-ictal when we arrived. She was incontinent of urine. " Pt has history of colon cancer and now a tumor on the liver. Pt had "Liver Yttrium-90 Radioembolization (Y90) yesterday at [place]."

## 2023-06-30 NOTE — ED PROVIDER NOTE - OBJECTIVE STATEMENT
Patient is a 62 year old female with PMH of cancer (colon) receiving radiation therapy (last treatment yesterday) presenting for possible seizure. Per EMS, patient's  witnessed tonic-clonic jerking episode that last several seconds, followed by a postictal state. Patient has no reported history of previous seizures.  is not bedside and no further history was obtained. Patient states her only complaint is nausea, vomiting; denies chest pain, headaches, abdominal pain, back pain, or additional complaints.

## 2023-06-30 NOTE — ED ADULT NURSE NOTE - NSFALLHARMRISKINTERV_ED_ALL_ED
Assistance OOB with selected safe patient handling equipment if applicable/Assistance with ambulation/Communicate risk of Fall with Harm to all staff, patient, and family/Monitor gait and stability/Provide visual cue: red socks, yellow wristband, yellow gown, etc/Reinforce activity limits and safety measures with patient and family/Bed in lowest position, wheels locked, appropriate side rails in place/Call bell, personal items and telephone in reach/Instruct patient to call for assistance before getting out of bed/chair/stretcher/Non-slip footwear applied when patient is off stretcher/[place] to call system/Physically safe environment - no spills, clutter or unnecessary equipment/Purposeful Proactive Rounding/Room/bathroom lighting operational, light cord in reach

## 2023-06-30 NOTE — CONSULT NOTE ADULT - SUBJECTIVE AND OBJECTIVE BOX
DENNY NGUYỄN     62y     Female    MRN-[id number]                                                           CC:Patient is a 62y old  Female who presents with a chief complaint of possible seizure (30 Jun 2023 12:28)      HPI:  Patient is a 62 year old female with hx of colon ca with liver mets s/p colon resection, chemo, Y90 brachytherapy for liver yesterday, presenting to ED after possible seizure at home witnessed by . Last night patient took 4 tablets of percocet in span of 2 hrs for abdominal pain from Y90. Patient woke up this morning.  noted sudden episode of rigidity, not following commands, foaming at mouth, incontinence. Episode lasted approx 3 min. Brought to ED by EMS. Patient post ictal in ED, now at baseline, does not remember event. No hx of seizure. No hx of opiate use prior to yesterday. Did not eat for 2 days. Per patient's interventional radiologist Dr. Ramesh Durán [facility], abdominal pain is expected to last approximately one week.    In ED: Na 130, CTH neg, CT a/p with liver mets, GB sludge. No murphys on exam. WBC 15, lactate 7.5. (30 Jun 2023 12:28)      ROS:  Constitutional, Neurological, Psychiatric, Eyes, ENT, Cardiovascular, Respiratory, Gastrointestinal, Genitourinary, Musculoskeletal, Integumentary, Endocrine and Heme/Lymph are otherwise negative. Except for    Social History: No smoking, No drinking, No drug use    FAMILY HISTORY:  No pertinent family history in first degree relatives        HEALTH ISSUES - PROBLEM Dx:          Vital Signs Last 24 Hrs  T(C): 35.6 (30 Jun 2023 14:19), Max: 36.9 (30 Jun 2023 09:07)  T(F): 96.1 (30 Jun 2023 14:19), Max: 98.4 (30 Jun 2023 09:07)  HR: 85 (30 Jun 2023 14:19) (84 - 92)  BP: 158/80 (30 Jun 2023 14:19) (158/80 - 163/88)  BP(mean): --  RR: 18 (30 Jun 2023 14:19) (18 - 20)  SpO2: 99% (30 Jun 2023 14:19) (99% - 100%)    Parameters below as of 30 Jun 2023 13:34  Patient On (Oxygen Delivery Method): room air          Neuro Exam:  Orientation: oriented to person, oriented to place and oriented to time.   Attention: normal concentrating ability and visual attention was not decreased.   Language: no difficulty naming common objects, no difficulty repeating a phrase, no difficulty writing a sentence, fluency intact, comprehension intact and reading intact.   Fund of knowledge: displays adequate knowledge of personal past history.   Cranial Nerves: visual acuity intact bilaterally, visual fields full to confrontation, pupils equal round and reactive to light, extraocular motion intact, facial sensation intact symmetrically, face symmetrical, hearing was intact bilaterally, tongue and palate midline, head turning and shoulder shrug symmetric and there was no tongue deviation with protrusion.   Motor: muscle tone was normal in all four extremities, muscle strength was normal in all four extremities and normal bulk in all four extremities.   Sensory exam: light touch was intact.   Coordination:. normal gait. balance was intact. there was no past-pointing. no tremor present.   Deep tendon reflexes:   Biceps right 2+. Biceps left 2+.    Triceps right 2+. Triceps left 2+.    Brachioradialis right 2+. Brachioradialis left 2+.    Patella right 2+. Patella left 2+.    Ankle jerk right 2+. Ankle jerk left 2+.   Plantar responses normal on the right, normal on the left.      NIHSS:     Allergies    No Known Allergies    Intolerances       Home Medications:  Actonel 150 mg oral tablet: 1 tab(s) orally once a month (30 Jun 2023 12:26)      MEDICATIONS  (STANDING):    MEDICATIONS  (PRN):  morphine  - Injectable 2 milliGRAM(s) IV Push every 6 hours PRN Severe Pain (7 - 10)  ondansetron Injectable 4 milliGRAM(s) IV Push every 8 hours PRN Nausea and/or Vomiting      LABS:                        12.3   14.91 )-----------( 137      ( 30 Jun 2023 09:15 )             35.9     06-30    131<L>  |  94<L>  |  11  ----------------------------<  150<H>  3.7   |  15<L>  |  0.7    Ca    9.2      30 Jun 2023 09:15  Phos  2.2     06-30  Mg     1.8     06-30    TPro  7.3  /  Alb  4.2  /  TBili  1.5<H>  /  DBili  0.4<H>  /  AST  62<H>  /  ALT  43<H>  /  AlkPhos  140<H>  06-30    PT/INR - ( 30 Jun 2023 09:15 )   PT: 15.70 sec;   INR: 1.36 ratio         PTT - ( 30 Jun 2023 09:15 )  PTT:26.6 sec          CT Head No Cont (06.30.23 @ 10:00)   Findings:    The ventricles and cortical sulci are normal in size and configuration.    There is no acute intracranial hemorrhage, extra-axial fluid collection   or midline shift.  Gray-white matter differentiation is maintained.    The visualized paranasal sinuses and mastoids are clear.    IMPRESSION:    No evidence of acute intracranial pathology.

## 2023-06-30 NOTE — ED PROVIDER NOTE - ATTENDING CONTRIBUTION TO CARE
Patient is a 62-year-old female with history of colon cancer and liver mets status post Y90 treatment and initiation of radiation therapy yesterday who comes in after witnessed tonic-clonic activity for couple seconds and post episode confusion.  No history of seizures.  Subsequent vomiting.    Exam: Confused, awake, soft nontender abdomen, lungs clear, cap refill less than 2 seconds, no signs of trauma  Plan: Labs, CT head, CT abdomen, EKG, antiemetics if QTc not prolonged

## 2023-06-30 NOTE — CONSULT NOTE ADULT - NS ATTEND AMEND GEN_ALL_CORE FT
Patient seen and examined and agree with above except as noted.  Patients history, notes, labs, imaging, vitals and meds reviewed personally.  Patient had hepatic procedure yesterday.  Had pain last night and took double the recommended dose of pain killers last night.  This morning wife was seen stretching and then  noticed she was foaming at the mouth and lay her on her side.  Still not fully acting normal but almost back to normal    Plan as above   would like her transferred to [facility]

## 2023-06-30 NOTE — CONSULT NOTE ADULT - ASSESSMENT
Patient is a 62 year old female with hx of colon ca with liver mets s/p colon resection, chemo, Y90 brachytherapy for liver yesterday, presenting to ED after possible seizure at home witnessed by . Last night patient took 4 tablets of percocet in span of 2 hrs for abdominal pain from Y90. Patient woke up this morning.  noted sudden episode of rigidity, not following commands, foaming at mouth, incontinence. Episode lasted approx 3 min. Brought to ED by EMS. Patient post ictal in ED, now at baseline, does not remember event. No hx of seizure. No hx of opiate use prior to yesterday. Did not eat for 2 days. Per patient's interventional radiologist Dr. Ramesh Durán [facility], abdominal pain is expected to last approximately one week.    In ED: Na 130, CTH neg, CT a/p with liver mets, GB sludge. No murphys on exam. WBC 15, lactate 7.5.      # possible seizure  - routine eeg  - seizure precautions  - keep Mg > 2.0  - correct electrolyte abnormalities

## 2023-06-30 NOTE — H&P ADULT - NSHPLABSRESULTS_GEN_ALL_CORE
12.3   14.91 )-----------( 137      ( 30 Jun 2023 09:15 )             35.9     06-30    131<L>  |  94<L>  |  11  ----------------------------<  150<H>  3.7   |  15<L>  |  0.7    Ca    9.2      30 Jun 2023 09:15  Phos  2.2     06-30    TPro  7.3  /  Alb  4.2  /  TBili  1.5<H>  /  DBili  0.4<H>  /  AST  62<H>  /  ALT  43<H>  /  AlkPhos  140<H>  06-30        Phosphorus: 2.2 mg/dL (06-30-23 @ 09:15)    Urinalysis Basic - ( 30 Jun 2023 09:15 )    Color: x / Appearance: x / SG: x / pH: x  Gluc: 150 mg/dL / Ketone: x  / Bili: x / Urobili: x   Blood: x / Protein: x / Nitrite: x   Leuk Esterase: x / RBC: x / WBC x   Sq Epi: x / Non Sq Epi: x / Bacteria: x    PT/INR - ( 30 Jun 2023 09:15 )   PT: 15.70 sec;   INR: 1.36 ratio      PTT - ( 30 Jun 2023 09:15 )  PTT:26.6 sec    Lactate Trend  06-30 @ 09:15 Lactate:7.5     CARDIAC MARKERS ( 30 Jun 2023 09:15 )  x     / <0.01 ng/mL / x     / x     / x          CT Head No Cont (06.30.23 @ 10:00)   IMPRESSION:  No evidence of acute intracranial pathology.        CT Abdomen and Pelvis w/ IV Cont (06.30.23 @ 10:26)     IMPRESSION:    1.Cholelithiasis with mild pericholecystic fat stranding, possibly   reflecting acute cholecystitis in the appropriate clinical setting; if   clinically warranted, further evaluation may be obtained with nuclear   medicine HIDA scan.  2. Numerous indeterminate bilobar hepatic masses; please note that no   prior abdominal imaging is available for direct comparison at this   facility.  3. Status post right colon resection, with an unremarkable appearance of   the ileocolic anastomosis.

## 2023-07-03 NOTE — EEG REPORT - NS EEG TEXT BOX
[facility] Department of Neurology  Inpatient Routine-Electroencephalography Report    Patient Name:	DENNY NGUYỄN    :	1960  MRN:	-    Study Date/Time:  2023, 2:44:23 PM  Referred by:  select    Brief Clinical History:  DENNY NGUYỄN is a 62 year old Female; study performed to investigate for seizures or markers of epilepsy.    Diagnosis Code: R56.9 convulsions/seizure  CPT:  44334 (awake/sleep)    Pertinent Medications    Acquisition Details:  Electroencephalography was acquired using a minimum of 21 channels on an [employer] system v 9.3.1 with electrode placement according to the standard International 10-20 system following ACNS (American Clinical Neurophysiology Society) guidelines.  Anterior temporal T1 and T2 electrodes were utilized whenever possible.   The [employer] automated spike & seizure detections were all reviewed in detail, in addition to the entire raw EEG.    Findings:  Background:  continuous, with predominantly alpha and beta frequencies.  Voltage:  Normal (20+ uV)  Organization: Appropriate anterior-posterior gradient.  Posterior Dominant Rhythm:  select symmetric, well-organized, and well-modulated.  Variability: Yes. 		Reactivity: Yes.  N2 sleep: Absent.  Focal abnormalities:    1)	No persistent asymmetries of voltage or frequency.  Spontaneous Activity:    None  Periodic/rhythmic activity:  None  Events:  No electrographic seizures or significant clinical events.  Provocations:  1)	Hyperventilation:  was not performed.  2)	Photic stimulation: was not performed.    Summary:  Normal  inpatient routine EEG study, awake and asleep.    1)	The EEG remained normal throughout the study      Clinical Correlation:  There were no findings of active epilepsy, however this alone does not rule out the diagnosis.     Lux Brown MD  Attending Neurologist, Division of Epilepsy

## 2023-07-06 DIAGNOSIS — C18.9 MALIGNANT NEOPLASM OF COLON, UNSPECIFIED: ICD-10-CM

## 2023-07-06 DIAGNOSIS — R11.2 NAUSEA WITH VOMITING, UNSPECIFIED: ICD-10-CM

## 2023-07-06 DIAGNOSIS — K80.80 OTHER CHOLELITHIASIS WITHOUT OBSTRUCTION: ICD-10-CM

## 2023-07-06 DIAGNOSIS — C78.7 SECONDARY MALIGNANT NEOPLASM OF LIVER AND INTRAHEPATIC BILE DUCT: ICD-10-CM

## 2023-07-06 DIAGNOSIS — N81.0 URETHROCELE: ICD-10-CM

## 2023-07-06 DIAGNOSIS — R56.9 UNSPECIFIED CONVULSIONS: ICD-10-CM

## 2023-07-18 PROBLEM — C18.9 MALIGNANT NEOPLASM OF COLON, UNSPECIFIED: Chronic | Status: ACTIVE | Noted: 2023-06-30

## 2023-10-06 ENCOUNTER — RESULT REVIEW (OUTPATIENT)
Age: 63
End: 2023-10-06

## 2023-10-06 ENCOUNTER — OUTPATIENT (OUTPATIENT)
Dept: OUTPATIENT SERVICES | Facility: HOSPITAL | Age: 63
LOS: 1 days | End: 2023-10-06
Payer: MEDICARE

## 2023-10-06 DIAGNOSIS — Z98.890 OTHER SPECIFIED POSTPROCEDURAL STATES: Chronic | ICD-10-CM

## 2023-10-06 DIAGNOSIS — Z12.31 ENCOUNTER FOR SCREENING MAMMOGRAM FOR MALIGNANT NEOPLASM OF BREAST: ICD-10-CM

## 2023-10-06 PROCEDURE — 77067 SCR MAMMO BI INCL CAD: CPT

## 2023-10-06 PROCEDURE — 77063 BREAST TOMOSYNTHESIS BI: CPT

## 2023-10-06 PROCEDURE — 77063 BREAST TOMOSYNTHESIS BI: CPT | Mod: 26

## 2023-10-06 PROCEDURE — 77067 SCR MAMMO BI INCL CAD: CPT | Mod: 26

## 2023-10-07 DIAGNOSIS — Z12.31 ENCOUNTER FOR SCREENING MAMMOGRAM FOR MALIGNANT NEOPLASM OF BREAST: ICD-10-CM

## 2024-04-15 ENCOUNTER — APPOINTMENT (OUTPATIENT)
Dept: OBGYN | Facility: CLINIC | Age: 64
End: 2024-04-15